# Patient Record
(demographics unavailable — no encounter records)

---

## 2025-07-17 NOTE — HISTORY OF PRESENT ILLNESS
[FreeTextEntry1] : Patient is a 19 year old P0 presenting for an annual visit. LMP 07/15/2025. She is feeling well and is without complaints. She denies vaginal itching, odor and discharge. Denies urinary urgency, frequency and dysuria. Patient reports she has never been sexually active and reports that she has been getting headaches more frequently and she says often she feel better after she eats. Patient reports feeling very fatigued as well. Discussed that it is hard to discern if her headaches are due to hypoglycemia, dehydration or her new BC, as she has had headaches in the past. Patient endorses that she does not get her menses but she skips placebo pills. Patient denies any auras prior to headaches. Patient wears glasses and has not had eye exam in years, recommended she have an eye exam as well. Discussed making adjustments and being seen by eye doctor and PCP to r/o etiology of fatigue as anemia can cause headaches and fatigue.  [Y] : Patient uses contraception [N] : Patient denies prior pregnancies [No] : Patient does not have concerns regarding sex [Never active] : never active

## 2025-07-17 NOTE — PLAN
[FreeTextEntry1] : 19 yr old for annual visit  - never sexually active no testing needed  - discussed continuing BC as unclear if headache are due to BC as she complains of fatigue as well and reports headache improved with good, discussed hydration and possibility of hypogylcemia cause headache  - recommended eye exam as she has not had one in a long time.  - f/u PRN